# Patient Record
Sex: MALE | Race: BLACK OR AFRICAN AMERICAN | NOT HISPANIC OR LATINO | URBAN - NONMETROPOLITAN AREA
[De-identification: names, ages, dates, MRNs, and addresses within clinical notes are randomized per-mention and may not be internally consistent; named-entity substitution may affect disease eponyms.]

---

## 2017-02-22 ENCOUNTER — OFFICE (OUTPATIENT)
Dept: URBAN - NONMETROPOLITAN AREA CLINIC 13 | Facility: CLINIC | Age: 67
End: 2017-02-22
Payer: COMMERCIAL

## 2017-02-22 ENCOUNTER — OFFICE (OUTPATIENT)
Dept: URBAN - NONMETROPOLITAN AREA CLINIC 13 | Facility: CLINIC | Age: 67
End: 2017-02-22

## 2017-02-22 VITALS
DIASTOLIC BLOOD PRESSURE: 92 MMHG | WEIGHT: 312 LBS | HEART RATE: 54 BPM | HEIGHT: 71 IN | SYSTOLIC BLOOD PRESSURE: 128 MMHG

## 2017-02-22 VITALS — HEIGHT: 71 IN

## 2017-02-22 DIAGNOSIS — Z79.899 OTHER LONG TERM (CURRENT) DRUG THERAPY: ICD-10-CM

## 2017-02-22 DIAGNOSIS — K21.9 GASTRO-ESOPHAGEAL REFLUX DISEASE WITHOUT ESOPHAGITIS: ICD-10-CM

## 2017-02-22 LAB
COMPLETE METABOLIC: ALBUMIN: 3.9 G/DL (ref 3.5–5.2)
COMPLETE METABOLIC: ALK. PHOS: 80 U/L (ref 40–150)
COMPLETE METABOLIC: ALT: 15 U/L (ref 0–55)
COMPLETE METABOLIC: AST: 17 U/L (ref 5–34)
COMPLETE METABOLIC: BUN: 25 MG/DL (ref 7–26)
COMPLETE METABOLIC: CALCIUM: 9.1 MG/DL (ref 8.4–10.2)
COMPLETE METABOLIC: CHLORIDE: 115 MMOL/L — HIGH (ref 98–107)
COMPLETE METABOLIC: CO2: 21 MEQ/L — LOW (ref 22–29)
COMPLETE METABOLIC: CREATININE: 1.4 MG/DL — HIGH (ref 0.6–1.3)
COMPLETE METABOLIC: GLUCOSE: 83 MG/DL (ref 70–99)
COMPLETE METABOLIC: POTASSIUM: 4.2 MMOL/L (ref 3.5–5.3)
COMPLETE METABOLIC: SODIUM: 142 MMOL/L (ref 136–145)
COMPLETE METABOLIC: TOTAL BILIRUBIN: 0.6 MG/DL (ref 0.2–1.2)
COMPLETE METABOLIC: TOTAL PROTEIN: 7.3 G/DL (ref 6.4–8.3)

## 2017-02-22 PROCEDURE — 99212 OFFICE O/P EST SF 10 MIN: CPT

## 2017-02-22 PROCEDURE — 80053 COMPREHEN METABOLIC PANEL: CPT | Performed by: INTERNAL MEDICINE

## 2017-02-22 PROCEDURE — 36415 COLL VENOUS BLD VENIPUNCTURE: CPT | Performed by: INTERNAL MEDICINE

## 2017-02-22 RX ORDER — PANTOPRAZOLE SODIUM 40 MG/1
TABLET, DELAYED RELEASE ORAL
Qty: 0 | Refills: 0 | Status: COMPLETED
End: 2017-02-22

## 2017-02-22 RX ORDER — RANITIDINE HYDROCHLORIDE 300 MG/1
TABLET, FILM COATED ORAL
Qty: 90 | Refills: 1 | Status: COMPLETED
Start: 2017-02-22 | End: 2018-01-30

## 2017-02-27 ENCOUNTER — OFFICE (OUTPATIENT)
Dept: URBAN - NONMETROPOLITAN AREA CLINIC 13 | Facility: CLINIC | Age: 67
End: 2017-02-27
Payer: COMMERCIAL

## 2017-02-27 VITALS — HEIGHT: 71 IN

## 2017-02-27 DIAGNOSIS — K21.9 GASTRO-ESOPHAGEAL REFLUX DISEASE WITHOUT ESOPHAGITIS: ICD-10-CM

## 2017-02-27 PROCEDURE — 82274 ASSAY TEST FOR BLOOD FECAL: CPT | Performed by: INTERNAL MEDICINE

## 2017-05-22 ENCOUNTER — OFFICE (OUTPATIENT)
Dept: URBAN - NONMETROPOLITAN AREA CLINIC 13 | Facility: CLINIC | Age: 67
End: 2017-05-22

## 2017-05-22 VITALS
SYSTOLIC BLOOD PRESSURE: 133 MMHG | WEIGHT: 314 LBS | DIASTOLIC BLOOD PRESSURE: 80 MMHG | HEART RATE: 55 BPM | HEIGHT: 71 IN

## 2017-05-22 DIAGNOSIS — K21.9 GASTRO-ESOPHAGEAL REFLUX DISEASE WITHOUT ESOPHAGITIS: ICD-10-CM

## 2017-05-22 PROCEDURE — 99212 OFFICE O/P EST SF 10 MIN: CPT

## 2018-01-30 ENCOUNTER — OFFICE (OUTPATIENT)
Dept: URBAN - NONMETROPOLITAN AREA CLINIC 13 | Facility: CLINIC | Age: 68
End: 2018-01-30

## 2018-01-30 VITALS
SYSTOLIC BLOOD PRESSURE: 129 MMHG | HEART RATE: 57 BPM | WEIGHT: 315 LBS | HEIGHT: 71 IN | DIASTOLIC BLOOD PRESSURE: 81 MMHG

## 2018-01-30 DIAGNOSIS — Z12.11 ENCOUNTER FOR SCREENING FOR MALIGNANT NEOPLASM OF COLON: ICD-10-CM

## 2018-01-30 PROCEDURE — 99212 OFFICE O/P EST SF 10 MIN: CPT

## 2018-02-02 ENCOUNTER — OFFICE (OUTPATIENT)
Dept: URBAN - NONMETROPOLITAN AREA CLINIC 13 | Facility: CLINIC | Age: 68
End: 2018-02-02
Payer: COMMERCIAL

## 2018-02-02 VITALS — HEIGHT: 71 IN

## 2018-02-02 DIAGNOSIS — K31.9 DISEASE OF STOMACH AND DUODENUM, UNSPECIFIED: ICD-10-CM

## 2018-02-02 PROCEDURE — 82274 ASSAY TEST FOR BLOOD FECAL: CPT

## 2019-01-30 ENCOUNTER — OFFICE (OUTPATIENT)
Dept: URBAN - NONMETROPOLITAN AREA CLINIC 13 | Facility: CLINIC | Age: 69
End: 2019-01-30

## 2019-01-30 VITALS
HEART RATE: 66 BPM | SYSTOLIC BLOOD PRESSURE: 137 MMHG | OXYGEN SATURATION: 95 % | DIASTOLIC BLOOD PRESSURE: 86 MMHG | WEIGHT: 314 LBS | HEIGHT: 71 IN

## 2019-01-30 DIAGNOSIS — Z79.899 OTHER LONG TERM (CURRENT) DRUG THERAPY: ICD-10-CM

## 2019-01-30 DIAGNOSIS — K21.9 GASTRO-ESOPHAGEAL REFLUX DISEASE WITHOUT ESOPHAGITIS: ICD-10-CM

## 2019-01-30 PROCEDURE — 99213 OFFICE O/P EST LOW 20 MIN: CPT

## 2019-02-06 ENCOUNTER — OFFICE (OUTPATIENT)
Dept: URBAN - NONMETROPOLITAN AREA CLINIC 13 | Facility: CLINIC | Age: 69
End: 2019-02-06
Payer: COMMERCIAL

## 2019-02-06 VITALS — HEIGHT: 71 IN

## 2019-02-06 DIAGNOSIS — K21.9 GASTRO-ESOPHAGEAL REFLUX DISEASE WITHOUT ESOPHAGITIS: ICD-10-CM

## 2019-02-06 PROCEDURE — 82274 ASSAY TEST FOR BLOOD FECAL: CPT

## 2020-02-19 ENCOUNTER — OFFICE (OUTPATIENT)
Dept: URBAN - NONMETROPOLITAN AREA CLINIC 13 | Facility: CLINIC | Age: 70
End: 2020-02-19

## 2020-02-19 VITALS
SYSTOLIC BLOOD PRESSURE: 135 MMHG | HEART RATE: 64 BPM | HEIGHT: 71 IN | OXYGEN SATURATION: 97 % | WEIGHT: 293 LBS | DIASTOLIC BLOOD PRESSURE: 85 MMHG

## 2020-02-19 DIAGNOSIS — R74.0 NONSPECIFIC ELEVATION OF LEVELS OF TRANSAMINASE AND LACTIC A: ICD-10-CM

## 2020-02-19 DIAGNOSIS — D64.9 ANEMIA, UNSPECIFIED: ICD-10-CM

## 2020-02-19 DIAGNOSIS — D12.6 BENIGN NEOPLASM OF COLON, UNSPECIFIED: ICD-10-CM

## 2020-02-19 PROCEDURE — 99214 OFFICE O/P EST MOD 30 MIN: CPT | Performed by: NURSE PRACTITIONER

## 2020-02-19 RX ORDER — ONDANSETRON HYDROCHLORIDE 4 MG/1
TABLET, FILM COATED ORAL
Qty: 2 | Refills: 0 | Status: COMPLETED
Start: 2020-02-19 | End: 2020-03-04

## 2020-02-19 RX ORDER — BISACODYL 5 MG
TABLET, DELAYED RELEASE (ENTERIC COATED) ORAL
Qty: 8 | Refills: 0 | Status: COMPLETED
Start: 2020-02-19 | End: 2020-03-04

## 2020-02-19 RX ORDER — POLYETHYLENE GLYCOL 3350, SODIUM SULFATE ANHYDROUS, SODIUM BICARBONATE, SODIUM CHLORIDE, POTASSIUM CHLORIDE 236; 22.74; 6.74; 5.86; 2.97 G/4L; G/4L; G/4L; G/4L; G/4L
POWDER, FOR SOLUTION ORAL
Qty: 1 | Refills: 0 | Status: COMPLETED
Start: 2020-02-19 | End: 2020-03-04

## 2020-02-20 LAB
CBC WITH WBC (DIFF): ACANTHOCYTE: NORMAL
CBC WITH WBC (DIFF): AGRANULAR NEUTROPHIL: NORMAL
CBC WITH WBC (DIFF): ANISOCYTOSIS: NORMAL
CBC WITH WBC (DIFF): ATYPICAL LYMPHOCYTE: NORMAL
CBC WITH WBC (DIFF): AUER RODS: NORMAL
CBC WITH WBC (DIFF): BAND: NORMAL
CBC WITH WBC (DIFF): BASOPHIL: 1 % (ref 0–1)
CBC WITH WBC (DIFF): BASOPHILIC STIPPLING: NORMAL
CBC WITH WBC (DIFF): BI-LOBED NEUTROPHIL: NORMAL
CBC WITH WBC (DIFF): BLAST: NORMAL
CBC WITH WBC (DIFF): BURR CELL: NORMAL
CBC WITH WBC (DIFF): DIMORPHIC RBC POPULATION: NORMAL
CBC WITH WBC (DIFF): DOHLE BODIES: NORMAL
CBC WITH WBC (DIFF): ELLIPTOCYTE: NORMAL
CBC WITH WBC (DIFF): EOSINOPHIL: 2 % (ref 0–5)
CBC WITH WBC (DIFF): GIANT PLATELET: NORMAL
CBC WITH WBC (DIFF): HEMATOCRIT: 46.6 % (ref 41–53)
CBC WITH WBC (DIFF): HEMOGLOBIN: 13.6 G/DL — LOW (ref 14–18)
CBC WITH WBC (DIFF): HOWELL JOLLY BODIES: NORMAL
CBC WITH WBC (DIFF): HYPERSEGMENTED NEUTROPHIL: NORMAL
CBC WITH WBC (DIFF): HYPOCHROMIA: NORMAL
CBC WITH WBC (DIFF): HYPOGRANULAR NEUTROPHIL: NORMAL
CBC WITH WBC (DIFF): IMMATURE GRANULOCYTES: 0 % (ref 0–1)
CBC WITH WBC (DIFF): LARGE PLATELET: NORMAL
CBC WITH WBC (DIFF): LYMPHOCYTE: 24 % (ref 20–40)
CBC WITH WBC (DIFF): MACROCYTOSIS: NORMAL
CBC WITH WBC (DIFF): MEAN CELL HEMOGLOBIN CONCENTRATION: 29.2 G/DL — LOW (ref 33–37)
CBC WITH WBC (DIFF): MEAN CELL HEMOGLOBIN: 26.9 PG — LOW (ref 27–31)
CBC WITH WBC (DIFF): MEAN CELL VOLUME: 92 FL (ref 84–100)
CBC WITH WBC (DIFF): MEAN PLATELET VOLUME: 11 FL (ref 8.3–11.9)
CBC WITH WBC (DIFF): METAMYELOCYTE: NORMAL
CBC WITH WBC (DIFF): MICROCYTOSIS: NORMAL
CBC WITH WBC (DIFF): MIX CELLS: NORMAL
CBC WITH WBC (DIFF): MONOCYTE: 7 % (ref 1–10)
CBC WITH WBC (DIFF): MYELOCYTE: NORMAL
CBC WITH WBC (DIFF): NEUTROPHIL SEGMENTED: 66 % (ref 50–70)
CBC WITH WBC (DIFF): NOTE: NORMAL
CBC WITH WBC (DIFF): NUCLEATED RBC: 0 /100WBC (ref 0–0)
CBC WITH WBC (DIFF): OVALOCYTE: NORMAL
CBC WITH WBC (DIFF): PAPPENHEIMER BODIES: NORMAL
CBC WITH WBC (DIFF): PATHOLOGIST INTERPRETATION: NORMAL
CBC WITH WBC (DIFF): PLATELET CLUMPS: NORMAL
CBC WITH WBC (DIFF): PLATELET COUNT: 213 /NL (ref 140–440)
CBC WITH WBC (DIFF): PLT SATELLITOSIS: NORMAL
CBC WITH WBC (DIFF): POIKILOCYTOSIS: NORMAL
CBC WITH WBC (DIFF): POLYCHROMASIA: NORMAL
CBC WITH WBC (DIFF): PROMYELOCYTE: NORMAL
CBC WITH WBC (DIFF): RBC MORPHOLOGY: NORMAL
CBC WITH WBC (DIFF): REACT LYMPH ABS MAN: NORMAL
CBC WITH WBC (DIFF): REACTIVE LYMPHOCYTE: NORMAL
CBC WITH WBC (DIFF): RED BLOOD CELL: 5.05 /PL (ref 4.7–6.1)
CBC WITH WBC (DIFF): RED CELL DIAMETER WIDTH: 49 FL — HIGH (ref 35–48)
CBC WITH WBC (DIFF): ROULEAUX RBC: NORMAL
CBC WITH WBC (DIFF): SCHISTOCYTE: NORMAL
CBC WITH WBC (DIFF): SICKLE CELL: NORMAL
CBC WITH WBC (DIFF): SMUDGE CELLS: NORMAL
CBC WITH WBC (DIFF): SPHEROCYTE: NORMAL
CBC WITH WBC (DIFF): STOMATOCYTE: NORMAL
CBC WITH WBC (DIFF): TARGET CELL: NORMAL
CBC WITH WBC (DIFF): TEAR DROP CELL: NORMAL
CBC WITH WBC (DIFF): TOXIC GRANULATION: NORMAL
CBC WITH WBC (DIFF): UNCLASSIFIED CELL: NORMAL
CBC WITH WBC (DIFF): VACUOLATED NEUTROPHIL: NORMAL
CBC WITH WBC (DIFF): WBC MORPHOLOGY: NORMAL
CBC WITH WBC (DIFF): WHITE BLOOD CELL: 8 /NL (ref 4.8–11)
COMPREHENSIVE METABOLIC PANEL: ALBUMIN: 4.2 G/DL (ref 3.5–4.8)
COMPREHENSIVE METABOLIC PANEL: ALKALINE PHOSPHATASE: 121 UNITS/L (ref 36–126)
COMPREHENSIVE METABOLIC PANEL: ALT: 131 UNITS/L — HIGH (ref ?–49)
COMPREHENSIVE METABOLIC PANEL: ANION GAP: 10 MMOL/L (ref 7–16)
COMPREHENSIVE METABOLIC PANEL: AST: 120 UNITS/L — HIGH (ref 17–59)
COMPREHENSIVE METABOLIC PANEL: BILIRUBIN, TOTAL: 0.4 MG/DL (ref 0.2–1.3)
COMPREHENSIVE METABOLIC PANEL: BUN/CREATININE RATIO: 16.4
COMPREHENSIVE METABOLIC PANEL: CALCIUM: 9.7 MG/DL (ref 8.4–10.2)
COMPREHENSIVE METABOLIC PANEL: CARBON DIOXIDE: 20 MMOL/L — LOW (ref 22–30)
COMPREHENSIVE METABOLIC PANEL: CHLORIDE: 112 MMOL/L — HIGH (ref 98–107)
COMPREHENSIVE METABOLIC PANEL: CREATININE: 1.4 MG/DL — HIGH (ref 0.66–1.25)
COMPREHENSIVE METABOLIC PANEL: GLUCOSE: 111 MG/DL — HIGH (ref 75–110)
COMPREHENSIVE METABOLIC PANEL: POTASSIUM: 4.2 MMOL/L (ref 3.5–5.3)
COMPREHENSIVE METABOLIC PANEL: PROTEIN, TOTAL, SERUM: 8.3 G/DL — HIGH (ref 6.3–8.2)
COMPREHENSIVE METABOLIC PANEL: SODIUM: 142 MMOL/L (ref 137–145)
COMPREHENSIVE METABOLIC PANEL: UREA NITROGEN (BUN): 23 MG/DL — HIGH (ref 9–20)
GFR: EGFR AFRICAN AMERICAN: >60 ML/MIN/1.73M2
GFR: EGFR NON-AFR.AMERICAN: 51 ML/MIN/1.73M2 — LOW (ref 60–?)
HEPATITIS PANEL, ACUTE W/REFLEX TO CONFIRMATION: CONFIRMATION: NORMAL
HEPATITIS PANEL, ACUTE W/REFLEX TO CONFIRMATION: HEPATITIS A IGM: NORMAL
HEPATITIS PANEL, ACUTE W/REFLEX TO CONFIRMATION: HEPATITIS B CORE ANTIBODY (IGM): NORMAL
HEPATITIS PANEL, ACUTE W/REFLEX TO CONFIRMATION: HEPATITIS B SURFACE ANTIGEN: NORMAL
HEPATITIS PANEL, ACUTE W/REFLEX TO CONFIRMATION: HEPATITIS C ANTIBODY: NORMAL
HEPATITIS PANEL, ACUTE W/REFLEX TO CONFIRMATION: SIGNAL TO CUT-OFF: 0.03 (ref ?–1)
IRON AND TOTAL IRON BINDING CAPACITY: % SATURATION: 22 % (ref 11–40)
IRON AND TOTAL IRON BINDING CAPACITY: IRON BINDING CAPACITY: 338 MCG/DL (ref 261–462)
IRON AND TOTAL IRON BINDING CAPACITY: IRON, TOTAL: 74 MCG/DL (ref 49–181)

## 2020-03-04 ENCOUNTER — AMBULATORY SURGICAL CENTER (OUTPATIENT)
Dept: URBAN - NONMETROPOLITAN AREA SURGERY 2 | Facility: SURGERY | Age: 70
End: 2020-03-04
Payer: COMMERCIAL

## 2020-03-04 VITALS
SYSTOLIC BLOOD PRESSURE: 132 MMHG | DIASTOLIC BLOOD PRESSURE: 76 MMHG | DIASTOLIC BLOOD PRESSURE: 66 MMHG | RESPIRATION RATE: 20 BRPM | HEART RATE: 79 BPM | RESPIRATION RATE: 18 BRPM | OXYGEN SATURATION: 100 % | HEART RATE: 77 BPM | DIASTOLIC BLOOD PRESSURE: 64 MMHG | DIASTOLIC BLOOD PRESSURE: 86 MMHG | SYSTOLIC BLOOD PRESSURE: 120 MMHG | OXYGEN SATURATION: 98 % | SYSTOLIC BLOOD PRESSURE: 121 MMHG | SYSTOLIC BLOOD PRESSURE: 101 MMHG | DIASTOLIC BLOOD PRESSURE: 80 MMHG | SYSTOLIC BLOOD PRESSURE: 112 MMHG | SYSTOLIC BLOOD PRESSURE: 119 MMHG | HEART RATE: 70 BPM | OXYGEN SATURATION: 96 % | HEART RATE: 46 BPM | DIASTOLIC BLOOD PRESSURE: 73 MMHG | HEIGHT: 71 IN | DIASTOLIC BLOOD PRESSURE: 54 MMHG | DIASTOLIC BLOOD PRESSURE: 56 MMHG | RESPIRATION RATE: 16 BRPM | HEART RATE: 76 BPM | SYSTOLIC BLOOD PRESSURE: 117 MMHG | SYSTOLIC BLOOD PRESSURE: 109 MMHG | WEIGHT: 293 LBS | SYSTOLIC BLOOD PRESSURE: 127 MMHG | OXYGEN SATURATION: 97 % | SYSTOLIC BLOOD PRESSURE: 103 MMHG | DIASTOLIC BLOOD PRESSURE: 63 MMHG | HEART RATE: 81 BPM | HEART RATE: 55 BPM | HEART RATE: 60 BPM | HEART RATE: 67 BPM | DIASTOLIC BLOOD PRESSURE: 77 MMHG

## 2020-03-04 DIAGNOSIS — Z86.010 PERSONAL HISTORY OF COLONIC POLYPS: ICD-10-CM

## 2020-03-04 PROCEDURE — G0105 COLORECTAL SCRN; HI RISK IND: HCPCS | Performed by: INTERNAL MEDICINE

## 2025-05-06 ENCOUNTER — OFFICE (OUTPATIENT)
Dept: URBAN - NONMETROPOLITAN AREA CLINIC 1 | Facility: CLINIC | Age: 75
End: 2025-05-06
Payer: MEDICARE

## 2025-05-06 VITALS
HEIGHT: 71 IN | DIASTOLIC BLOOD PRESSURE: 88 MMHG | SYSTOLIC BLOOD PRESSURE: 137 MMHG | WEIGHT: 240 LBS | HEART RATE: 60 BPM

## 2025-05-06 DIAGNOSIS — Z98.890 OTHER SPECIFIED POSTPROCEDURAL STATES: ICD-10-CM

## 2025-05-06 DIAGNOSIS — K83.09 OTHER CHOLANGITIS: ICD-10-CM

## 2025-05-06 DIAGNOSIS — Z79.02 LONG TERM (CURRENT) USE OF ANTITHROMBOTICS/ANTIPLATELETS: ICD-10-CM

## 2025-05-06 DIAGNOSIS — T85.590A OTHER MECHANICAL COMPLICATION OF BILE DUCT PROSTHESIS, INITI: ICD-10-CM

## 2025-05-06 DIAGNOSIS — K86.1 OTHER CHRONIC PANCREATITIS: ICD-10-CM

## 2025-05-06 PROCEDURE — 99213 OFFICE O/P EST LOW 20 MIN: CPT | Performed by: NURSE PRACTITIONER

## 2025-05-06 NOTE — SERVICEHPINOTES
Patient presents to the clinic today with daughter for hospital follow-up of ascending cholangitis and ERCP.  Patient presented to the [facility] ER 04/05/2025 with complaints of weakness, chills, diarrhea labs significant for leukocytosis, hyperglycemia, elevated liver enzymes, KIRSTY, CT abdomen and pelvis revealed repositioning of the biliary catheter, chronic pancreatitis.  He underwent EGD and ERCP by Dr. Leiva 4/5/25 that revealed multiple large floating filling defects consistent with stones, completes sphincterotomy performed, multiple sweep performed in the common bile duct hepatic duct, stones and debris removed, clearance could not be determined, smooth stricture visualized in the distal common bile duct, 10 Tamazight by 5 cm duodenal bend stent placed in the common bile duct, EGD- there was a fully covered metal stent visualized in the major papilla the stent was removed. he was started on Augmentin b.i.d. for ascending cholangitis upon hospital discharge. He will need KUB to determine if common bile duct stent present or if it spontaneously passed, will also need follow up ERCP for stent removal if present as well as surveillance of marked dilated bile ducts. after EGD ERCP labs significantly improved.  
br
br At current, he lives with his daughter and states he feels better.  He has a good appetite, denies fever, abdominal pain, nausea vomiting diarrhea, melena, hematochezia, he has completed the Augmentin medication from discharge. He ambulates with a cane and he is extremely hard of hearing.  br
brLabs 4/8/25- wbc 4.9, hgb 10.8, hct 34.8, plt 121, AST 35, ALT 84, , T.BILI 0.3, br
br 
ERCP by Dr. Leiva 4/5/25
brThe major papilla had a bulky and friable appearance.brDilation was observed with a maximal diameter of 15 mmbrMultiple large floating filling defects consistent with stones were visualizedbrComplete sphincterotomy was performed.brMultiple sweeps were performed in the common bile duct and hepatic duct. Stones and debris were removed, but clearance could not be determined.brSmooth stricture was visualized in the distal common bile proane31 Fr x 5 cm duodenal bend stent was placed in the common bile duct br

br EGD by Dr. Leiva 4/5/25brThe esophagus appeared normal.brErythematous, petechial mucosa in the fundus of the stomach, body of the stomach and antrumbrFully covered metal stent was visualized in the major papilla. The stent was removed.brRecommendationbrFollow up with PCPbrOccluded Wallstent was removed using rat-tooth forceps and a standard upper endoscope. br
br   Per Hospital notes Dr. Leiva 4/5/2025
brHistory Of Present IllnessBjorn Vuong is a 74 y.o. male with a past medical history of CKD, DM, HTN, Status post radiation therapy for ependymoma 5/2024-[facility] who presented to the ER by EMS after a fall. His daughter reports that he had complained of weakness and chills yesterday. He tried to get up this morning and fell back on the bed. No injuries sustained. He was brought in for evaluation. brAbnormal labs in the ER significant for leukocytosis, hyperglycemia, elevated liver enzymes, acute kidney injury. brCTAP with findings that there appears to have been repositioning of the biliary catheter, chronic pancreatitis. brHe reports being in his normal state of health prior to yesterday when he was having weakness and fever/chills. He denies abdominal pain, nausea, vomiting. No changes in appetite or eating habits. He denies dysphagia, odynophagia, acid reflux/heartburn. No unintentional weight loss. No changes in bowel habits. Denies melena or hematochezia. No shortness of breath, chest pain, dizziness. brGI has been consulted for endoscopic evaluation of biliary stent placement/position.
br
brGI History [id number], Abbie, ERCP- Description of indication: The patient had a fully covered 8x10 biliary wall stent placed in 2015 for nonhealing biliary leak after cholecystectomy and severe pancreatitis. Initial plastic stent occluded. This has become occluded on 2 occasions requiring urgent endoscopic clearing of the stent. Subsequently, 4 weeks ago a 2nd fully covered wall stent was placed inside the original stent with complete overlapping in an attempt to disengaged the stent for removal.brFINDINGS/POSTOPERATIVE DIAGNOSIS: br1. Two overlapping expandable metal biliary stents intact in the extrahepatic biliary tree.br2. Some sludge and soft debris removed from moderately dilated biliary tree.br3. Unsuccessful attempt at removal of biliary stents., Abbie, ERCP- 1. Endoscopic retrograde cholangiogram. 2. Internal biliary Wallstent removal by snare. 3. Extraction balloon and trapezoid use removal of multiple biliary soft stones and debris.4. 8 cm x 10 mm fully covered Wallstent placed within previous stent with overlap.brFINDINGS/POSTOPERATIVE DIAGNOSIS: br1. Multiple soft stones and copious debris removed from intrahepatic and extrahepatic biliary tree.br2. Fully covered biliary Wallstent placement within previous Wallstent with overlap above and below.br7-2019, Short, ERCP- Copious soft stones and biliary debris removed from intra and extrahepatic biliary tree. 2. Exchange of internal Wallstent placement with new 4 cm x 10 mm expandable metal Wallstent.br5-2019, Short, ERCP- Benign duodenal tissue ingrowth into the distal aspect of the previously placed Wallstent causing high-grade obstruction.br2. Copious debris and soft stones throughout the stent and above, removed.br3. Partial removal of duodenal tissue with needle knife.br4. A 4 cm x 10 mm fully covered Wallstent placed into the distal aspect and overlapping the previously placed stent.br9-,Short, ERCP - Biliary stent exchange for expandable metal stent.br2. No evidence of cystic duct leak.br3. Strictured common bile duct.br4. Edematous and extrinsically compressed duodenum.br5. Diffuse erosive gastritis with extrinsic compression of the stomach, posteriorly.br9-, Short,ERCP- The stomach is compressed and there is diffuse pan gastritis.br The duodenum is edematous and extrinsicly compressed.br The ampulla could be identified only because of a stent seen, this was removed and it appeared to be filled with gelatinous debris.br The distal 6 centimeters of common bile duct is strictured / compressed and the bile ducts were dilated above this. The cystic duct remnant was prominent but no leak was seen off of this.br An 8 centimeters x 10 millimeters expandable wall stent was placed across the strictured area with good position and flow noted fluoroscopically.br8-, Short, ERCP- brERCP findings -br there is hemorrhagic pangastritis with intraluminal blood noted in the stomach.br The gastric antrum and duodenum are deformed and poorly distended presumed secondary to extrinsic compression.br the ampulla appeared normal.br The intrahepatic ducts and common hepatic duct are dilated, the cystic duct is large and a leak is noted off the cystic duct remnant with contrast going into a pigtail catheter.br There is an abrupt cut off in the proximal common bile duct less than 1 centimeter from the cystic duct remnant.br The more distal common bile duct appeared normal.br No discrete stones were identified, nor could a surgical clip be seen across this area.br a 7 centimeters 10 Tamazight biliary stent was placed across the strictured area with flow documented from hepatic duct and into the duodenum.brPast Medical Historybr
br
Assessment and PlanbrCKDbrDMbrHTNbrS/p rad therapy for ependymoma 5-24 KCCbrChronic pancreatitis brElevated liver enzymesbrAKI on CKDbrHx of PE with IVC filterCynthiahn Jose L Vuong is a 74 y.o. male with a past medical history of CKD, DM, HTN, Status post radiation therapy for ependymoma 5/2024-[facility] who presented to the ER by EMS after a fall due to weakness and reported fever/chills yesterday. He was brought to ER via EMS for further evaluation. ER workup significant for elevated liver enzymes and CTAP with findings that there appears to have been repositioning of the biliary catheter. GI asked to see for endoscopic evaluation of stent. Will get him scheduled for ERCP. Further recommendations to follow. He and his family are in agreement. 74-year-old in 2015 had severe biliary pancreatitis, underwent cholecystectomy and developed biliary leak with eventual Wallstent placement in a critically ill patient. Attempts to remove this Wallstent subsequently were unsuccessful. In 2021 he did require multiple soft stones and debris removed from his biliary tree with the stent still in Situ. He now presents with jaundice, ascending cholangitis, and CT scan suggested his biliary stent has migrated into his duodenum. There was a question of a recent fall and whether this traumatic injury has caused his stent to become dislodgedbrHis biliary tract needs drainage and he needs IV antibiotics. The indications and risks of ERCP were discussed with the patient and he is agreeable to proceed with this as a first-line procedure. I did discuss with him the need for biliary drainage today and that transhepatic cholangiogram may be required if ERCP is unsuccessful. He is agreeable to this plan of action.brAgree with your antibiotic therapy.  br br4-6-2025 looks better. Bilirubin has declined.brContinue proton pump inhibitor therapybrNeeds antibiotic treatment for ascending cholangitisbrDiet as toleratedbrHis present stent may pass spontaneously but I will plan to re-evaluate in office in 4 weeks as outpatient with KUB 1st and remove it if it is still in.brThere were multiple air bubbles in his markedly dilated ducts and I could not be sure all stone fragments were removed, this will need to be rechecked.Tom anticipate home Monday or Tuesday from my standpoint4/7/25 continues with improvement. Bilirubin normal. Would consider Augmentin 875 mg b.i.d. for 7-10 days. As oral therapy for cholangitis Will plan to see in office in 4 weeks. Contact office if fever, jaundice, worsening abdominal pain.

## 2025-05-06 NOTE — SERVICENOTES
thorough review of hospitalization and procedure reports outlined above
We will obtain KUB today if stent still present we will schedule patient for follow up ERCP with stent removal and surveillance of dilated bile ducts outlined above 
Risks of procedure explained to patient and he wishes to proceed
We will trend labs today
We will need to hold Xarelto 3 days a.c. procedure -history of PE IVC filter in place
Procedure in hospital setting

## 2025-07-24 LAB — Lab: (no result)
